# Patient Record
Sex: MALE | Race: WHITE
[De-identification: names, ages, dates, MRNs, and addresses within clinical notes are randomized per-mention and may not be internally consistent; named-entity substitution may affect disease eponyms.]

---

## 2020-11-02 ENCOUNTER — HOSPITAL ENCOUNTER (OUTPATIENT)
Dept: HOSPITAL 47 - RADECHMAIN | Age: 65
Discharge: HOME | End: 2020-11-02
Attending: FAMILY MEDICINE
Payer: MEDICARE

## 2020-11-02 DIAGNOSIS — R00.1: Primary | ICD-10-CM

## 2020-11-02 PROCEDURE — 93306 TTE W/DOPPLER COMPLETE: CPT

## 2020-11-03 NOTE — ECHOF
Referral Reason:R00.1 Bradycardia



MEASUREMENTS

--------

HEIGHT: 180.3 cm

WEIGHT: 84.4 kg

BP: 

IVSd:   1.3 cm     (0.6 - 1.1)

LVIDd:   2.9 cm     (3.9 - 5.3)

LVPWd:   1.4 cm     (0.6 - 1.1)

IVSs:   1.8 cm

LVIDs:   1.7 cm

LVPWs:   2.0 cm

LAESV Index (A-L):   25.31 ml/m

Ao Diam:   3.5 cm     (2.0 - 3.7)

AV Cusp:   2.7 cm     (1.5 - 2.6)

LA Diam:   3.0 cm     (2.7 - 3.8)

MV EXCURSION:   16.659 mm     (> 18.000)

MV EF SLOPE:   106 mm/s     (70 - 150)

EPSS:   1.0 cm

MV E Jared:   0.80 m/s

MV DecT:   250 ms

MV A Jared:   1.04 m/s

MV E/A Ratio:   0.77 

RAP:   5.00 mmHg

RVSP:   25.48 mmHg







FINDINGS

--------

This was a technically good study.

The left ventricular size is normal.   There is mild concentric left ventricular hypertrophy.   Overa
ll left ventricular systolic function is normal with, an EF between 55 - 60 %.   Normal LAP Grade 1 D
iastolic Dysfunction.

The right ventricle is normal in size.

The left atrial size is normal.    Normal LA  size by volume 22+/-6 ml/m2.

The right atrial size is normal.

Interatrial and interventricular septum intact.

Aortic valve is trileaflet and is mildly thickened.

The mitral valve is normal.   The mitral valve leaflets are mildly thickened.   Mild mitral annular c
alcification present.   There is trace mitral regurgitation.

The tricuspid valve appears structurally normal.   Mild tricuspid regurgitation present.   Right vent
ricular systolic pressure is normal at < 35 mmHg.

There is no pulmonic regurgitation present.

The aortic root size is normal.

Normal inferior vena cava with normal inspiratory collapse consistent with estimated right atrial pre
ssure of  5 mmHg.

There is no pericardial effusion.



CONCLUSIONS

--------

1. The left ventricular size is normal.

2. There is mild concentric left ventricular hypertrophy.

3. Overall left ventricular systolic function is normal with, an EF between 55 - 60 %.

4. Normal LAP Grade 1 Diastolic Dysfunction.

5. Aortic valve is trileaflet and is mildly thickened.

6. The mitral valve leaflets are mildly thickened.

7. Mild mitral annular calcification present.

8. There is trace mitral regurgitation.

9. Mild tricuspid regurgitation present.

10. There is no pericardial effusion.





SONOGRAPHER: Terra Ty RDCS

## 2021-03-28 ENCOUNTER — HOSPITAL ENCOUNTER (INPATIENT)
Dept: HOSPITAL 47 - EC | Age: 66
LOS: 3 days | Discharge: HOME | DRG: 65 | End: 2021-03-31
Attending: FAMILY MEDICINE | Admitting: FAMILY MEDICINE
Payer: MEDICARE

## 2021-03-28 DIAGNOSIS — R20.0: ICD-10-CM

## 2021-03-28 DIAGNOSIS — R00.1: ICD-10-CM

## 2021-03-28 DIAGNOSIS — E78.00: ICD-10-CM

## 2021-03-28 DIAGNOSIS — I63.22: Primary | ICD-10-CM

## 2021-03-28 DIAGNOSIS — F10.130: ICD-10-CM

## 2021-03-28 DIAGNOSIS — Z79.82: ICD-10-CM

## 2021-03-28 DIAGNOSIS — Z82.3: ICD-10-CM

## 2021-03-28 DIAGNOSIS — I10: ICD-10-CM

## 2021-03-28 PROCEDURE — 82553 CREATINE MB FRACTION: CPT

## 2021-03-28 PROCEDURE — 70450 CT HEAD/BRAIN W/O DYE: CPT

## 2021-03-28 PROCEDURE — 99285 EMERGENCY DEPT VISIT HI MDM: CPT

## 2021-03-28 PROCEDURE — 85610 PROTHROMBIN TIME: CPT

## 2021-03-28 PROCEDURE — 93312 ECHO TRANSESOPHAGEAL: CPT

## 2021-03-28 PROCEDURE — 85730 THROMBOPLASTIN TIME PARTIAL: CPT

## 2021-03-28 PROCEDURE — 83036 HEMOGLOBIN GLYCOSYLATED A1C: CPT

## 2021-03-28 PROCEDURE — 93306 TTE W/DOPPLER COMPLETE: CPT

## 2021-03-28 PROCEDURE — 93005 ELECTROCARDIOGRAM TRACING: CPT

## 2021-03-28 PROCEDURE — 36415 COLL VENOUS BLD VENIPUNCTURE: CPT

## 2021-03-28 PROCEDURE — 80061 LIPID PANEL: CPT

## 2021-03-28 PROCEDURE — 84484 ASSAY OF TROPONIN QUANT: CPT

## 2021-03-28 PROCEDURE — 70498 CT ANGIOGRAPHY NECK: CPT

## 2021-03-28 PROCEDURE — 93320 DOPPLER ECHO COMPLETE: CPT

## 2021-03-28 PROCEDURE — 80053 COMPREHEN METABOLIC PANEL: CPT

## 2021-03-28 PROCEDURE — 93325 DOPPLER ECHO COLOR FLOW MAPG: CPT

## 2021-03-28 PROCEDURE — 70496 CT ANGIOGRAPHY HEAD: CPT

## 2021-03-28 PROCEDURE — 85025 COMPLETE CBC W/AUTO DIFF WBC: CPT

## 2021-03-28 PROCEDURE — 70551 MRI BRAIN STEM W/O DYE: CPT

## 2021-03-28 PROCEDURE — 84207 ASSAY OF VITAMIN B-6: CPT

## 2021-03-28 PROCEDURE — 96360 HYDRATION IV INFUSION INIT: CPT

## 2021-03-28 PROCEDURE — 82550 ASSAY OF CK (CPK): CPT

## 2021-03-28 PROCEDURE — 93880 EXTRACRANIAL BILAT STUDY: CPT

## 2021-03-28 PROCEDURE — 82607 VITAMIN B-12: CPT

## 2021-03-28 PROCEDURE — 87635 SARS-COV-2 COVID-19 AMP PRB: CPT

## 2021-03-28 PROCEDURE — 84443 ASSAY THYROID STIM HORMONE: CPT

## 2021-03-28 PROCEDURE — 82747 ASSAY OF FOLIC ACID RBC: CPT

## 2021-03-29 LAB
ALBUMIN SERPL-MCNC: 4.1 G/DL (ref 3.5–5)
ALP SERPL-CCNC: 100 U/L (ref 38–126)
ALT SERPL-CCNC: 55 U/L (ref 4–49)
ANION GAP SERPL CALC-SCNC: 8 MMOL/L
APTT BLD: 22.7 SEC (ref 22–30)
AST SERPL-CCNC: 38 U/L (ref 17–59)
BASOPHILS # BLD AUTO: 0.1 K/UL (ref 0–0.2)
BASOPHILS NFR BLD AUTO: 1 %
BUN SERPL-SCNC: 20 MG/DL (ref 9–20)
CALCIUM SPEC-MCNC: 9.4 MG/DL (ref 8.4–10.2)
CHLORIDE SERPL-SCNC: 106 MMOL/L (ref 98–107)
CK SERPL-CCNC: 106 U/L (ref 55–170)
CO2 SERPL-SCNC: 24 MMOL/L (ref 22–30)
EOSINOPHIL # BLD AUTO: 0.4 K/UL (ref 0–0.7)
EOSINOPHIL NFR BLD AUTO: 6 %
ERYTHROCYTE [DISTWIDTH] IN BLOOD BY AUTOMATED COUNT: 5.12 M/UL (ref 4.3–5.9)
ERYTHROCYTE [DISTWIDTH] IN BLOOD: 13 % (ref 11.5–15.5)
GLUCOSE BLD-MCNC: 80 MG/DL (ref 75–99)
GLUCOSE SERPL-MCNC: 82 MG/DL (ref 74–99)
HCT VFR BLD AUTO: 47.7 % (ref 39–53)
HGB BLD-MCNC: 16.1 GM/DL (ref 13–17.5)
INR PPP: 1 (ref ?–1.2)
LYMPHOCYTES # SPEC AUTO: 2.2 K/UL (ref 1–4.8)
LYMPHOCYTES NFR SPEC AUTO: 29 %
MCH RBC QN AUTO: 31.4 PG (ref 25–35)
MCHC RBC AUTO-ENTMCNC: 33.7 G/DL (ref 31–37)
MCV RBC AUTO: 93 FL (ref 80–100)
MONOCYTES # BLD AUTO: 0.6 K/UL (ref 0–1)
MONOCYTES NFR BLD AUTO: 8 %
NEUTROPHILS # BLD AUTO: 4.2 K/UL (ref 1.3–7.7)
NEUTROPHILS NFR BLD AUTO: 55 %
PLATELET # BLD AUTO: 189 K/UL (ref 150–450)
POTASSIUM SERPL-SCNC: 4 MMOL/L (ref 3.5–5.1)
PROT SERPL-MCNC: 6.9 G/DL (ref 6.3–8.2)
PT BLD: 10.5 SEC (ref 9–12)
SODIUM SERPL-SCNC: 138 MMOL/L (ref 137–145)
TROPONIN I SERPL-MCNC: <0.012 NG/ML (ref 0–0.03)
WBC # BLD AUTO: 7.5 K/UL (ref 3.8–10.6)

## 2021-03-29 RX ADMIN — CLOPIDOGREL BISULFATE SCH MG: 75 TABLET ORAL at 09:09

## 2021-03-29 RX ADMIN — CEFAZOLIN SCH MLS/HR: 330 INJECTION, POWDER, FOR SOLUTION INTRAMUSCULAR; INTRAVENOUS at 02:05

## 2021-03-29 RX ADMIN — Medication SCH MG: at 17:40

## 2021-03-29 NOTE — CT
EXAMINATION TYPE: CT brain wo con for TPA

 

DATE OF EXAM: 3/29/2021

 

COMPARISON: None

 

HISTORY: R/O Stroke, Rt sided numbness

 

CT DLP: 1060 mGycm

Automated exposure control for dose reduction was used.

 

Images obtained of the brain without contrast.

 

Ventricles and sulci appear within normal limits. There is 1 cm area of white matter hypodensity in t
he lateral anterior aspect left internal capsule.. The calvarium is intact. Skull base is intact. The
re is no evidence of intracranial hemorrhage.

 

IMPRESSION:

Lacunar infarct anterior left internal capsule appears old. No acute intracranial abnormality.

## 2021-03-29 NOTE — ECHOF
Referral Reason:Thrombus



MEASUREMENTS

--------

HEIGHT: 182.9 cm

WEIGHT: 91.2 kg

BP: 175/87

RVIDd:   2.6 cm     (< 3.3)

IVSd:   1.2 cm     (0.6 - 1.1)

LVIDd:   3.9 cm     (3.9 - 5.3)

LVPWd:   1.1 cm     (0.6 - 1.1)

IVSs:   1.5 cm

LVIDs:   2.9 cm

LVPWs:   1.4 cm

LAESV Index (A-L):   18.86 ml/m

Ao Diam:   3.6 cm     (2.0 - 3.7)

AV Cusp:   1.7 cm     (1.5 - 2.6)

MV EXCURSION:   15.618 mm     (> 18.000)

MV EF SLOPE:   93 mm/s     (70 - 150)

EPSS:   0.9 cm

MV E Jared:   0.56 m/s

MV DecT:   303 ms

MV A Jared:   0.94 m/s

MV E/A Ratio:   0.60 

RAP:   5.00 mmHg

RVSP:   23.73 mmHg







FINDINGS

--------

Sinus rhythm.

This was a technically good study.

The left ventricular size is normal.   There is borderline concentric left ventricular hypertrophy.  
 Overall left ventricular systolic function is normal with, an EF between 55 - 60 %.

The right ventricle is normal in size.

Normal LA  size by volume 22+/-6 ml/m2.

The right atrial size is normal.

There is mild aortic valve sclerosis.   There is no evidence of aortic regurgitation.

Mild mitral annular calcification present.   Mild mitral regurgitation is present.

Mild tricuspid regurgitation present.   There is no evidence of pulmonary hypertension.

Trace/mild (physiologic)  pulmonic regurgitation.

The aortic root size is normal.

Echo free space represents a pericardial fat pad.



CONCLUSIONS

--------

1. The left ventricular size is normal.

2. There is borderline concentric left ventricular hypertrophy.

3. Overall left ventricular systolic function is normal with, an EF between 55 - 60 %.

4. The right ventricle is normal in size.

5. Normal LA size by volume 22+/-6 ml/m2.

6. The right atrial size is normal.

7. There is mild aortic valve sclerosis.

8. Mild mitral annular calcification present.

9. Mild mitral regurgitation is present.

10. Mild tricuspid regurgitation present.

11. Trace/mild (physiologic)  pulmonic regurgitation.

12. The aortic root size is normal.

13. Echo free space represents a pericardial fat pad.





SONOGRAPHER: Rajwinder Dow RDCS

## 2021-03-29 NOTE — CT
EXAMINATION TYPE: CT angio head neck

 

DATE OF EXAM: 3/29/2021

 

COMPARISON: None

 

HISTORY: R/O Stroke, Rt sided numbness

 

CT DLP: 588.5 mGycm

Automated exposure control for dose reduction was used.

 

CONTRAST: 

Performed with IV Contrast, patient injected with 65 mL of Isovue 370.

 

Images were obtained from the aortic arch to the vertex of the brain with IV contrast. There are 3-D 
post processed images.

 

There is normal branching pattern of the great vessels on the aortic arch. There is bilateral arteria
l flow in the subclavian arteries.

 

There is arterial flow in both vertebral arteries. There is arterial flow in the common internal and 
external carotid arteries bilaterally. There is wide patency of the carotid artery bifurcations. Ther
e is no evidence of carotid or vertebral artery aneurysm or dissection.

 

There is arterial flow in the anterior middle and posterior cerebral arteries. There is arterial flow
 in the vertebrobasilar artery system. There is normal contrast opacification of the venous sinuses.

 

There is no mass effect. There is no sign of intracranial aneurysm or neovascularity. There is no radha
dence of hemodynamic stenosis. The right anterior cerebral artery appears to fill entirely through th
e anterior communicating artery.

 

IMPRESSION:

Negative CT angiogram of the neck. Negative CT angiogram of the brain.

## 2021-03-29 NOTE — ED
Neuro HPI





- General


Chief Complaint: Neuro Symptoms/Deficit


Stated Complaint: Right arm tingling, disoriented


Time Seen by Provider: 03/28/21 23:57


Source: patient, family


Mode of arrival: wheelchair


Limitations: no limitations





- Related Data


Allergies/Adverse Reactions: 


                                    Allergies











Allergy/AdvReac Type Severity Reaction Status Date / Time


 


No Known Allergies Allergy   Verified 03/28/21 23:54














Review of Systems


ROS Statement: 


Those systems with pertinent positive or pertinent negative responses have been 

documented in the HPI.





ROS Other: All systems not noted in ROS Statement are negative.





General Exam


Limitations: no limitations





Stroke MDM





- Lab Data


Result diagrams: 


                                 03/29/21 00:20





                                 03/29/21 00:20


                                   Lab Results











  03/29/21 03/29/21 03/29/21 Range/Units





  00:18 00:20 00:20 


 


WBC   7.5   (3.8-10.6)  k/uL


 


RBC   5.12   (4.30-5.90)  m/uL


 


Hgb   16.1   (13.0-17.5)  gm/dL


 


Hct   47.7   (39.0-53.0)  %


 


MCV   93.0   (80.0-100.0)  fL


 


MCH   31.4   (25.0-35.0)  pg


 


MCHC   33.7   (31.0-37.0)  g/dL


 


RDW   13.0   (11.5-15.5)  %


 


Plt Count   189   (150-450)  k/uL


 


MPV   7.0   


 


Neutrophils %   55   %


 


Lymphocytes %   29   %


 


Monocytes %   8   %


 


Eosinophils %   6   %


 


Basophils %   1   %


 


Neutrophils #   4.2   (1.3-7.7)  k/uL


 


Lymphocytes #   2.2   (1.0-4.8)  k/uL


 


Monocytes #   0.6   (0-1.0)  k/uL


 


Eosinophils #   0.4   (0-0.7)  k/uL


 


Basophils #   0.1   (0-0.2)  k/uL


 


PT    10.5  (9.0-12.0)  sec


 


INR    1.0  (<1.2)  


 


APTT    22.7  (22.0-30.0)  sec


 


Sodium     (137-145)  mmol/L


 


Potassium     (3.5-5.1)  mmol/L


 


Chloride     ()  mmol/L


 


Carbon Dioxide     (22-30)  mmol/L


 


Anion Gap     mmol/L


 


BUN     (9-20)  mg/dL


 


Creatinine     (0.66-1.25)  mg/dL


 


Est GFR (CKD-EPI)AfAm     (>60 ml/min/1.73 sqM)  


 


Est GFR (CKD-EPI)NonAf     (>60 ml/min/1.73 sqM)  


 


Glucose     (74-99)  mg/dL


 


POC Glucose (mg/dL)  80    (75-99)  mg/dL


 


POC Glu Operater ID  Jimmy Pennington    


 


Calcium     (8.4-10.2)  mg/dL


 


Total Bilirubin     (0.2-1.3)  mg/dL


 


AST     (17-59)  U/L


 


ALT     (4-49)  U/L


 


Alkaline Phosphatase     ()  U/L


 


Creatine Kinase     ()  U/L


 


CK-MB (CK-2)     (0.0-2.4)  ng/mL


 


Troponin I     (0.000-0.034)  ng/mL


 


Total Protein     (6.3-8.2)  g/dL


 


Albumin     (3.5-5.0)  g/dL














  03/29/21 03/29/21 Range/Units





  00:20 00:20 


 


WBC    (3.8-10.6)  k/uL


 


RBC    (4.30-5.90)  m/uL


 


Hgb    (13.0-17.5)  gm/dL


 


Hct    (39.0-53.0)  %


 


MCV    (80.0-100.0)  fL


 


MCH    (25.0-35.0)  pg


 


MCHC    (31.0-37.0)  g/dL


 


RDW    (11.5-15.5)  %


 


Plt Count    (150-450)  k/uL


 


MPV    


 


Neutrophils %    %


 


Lymphocytes %    %


 


Monocytes %    %


 


Eosinophils %    %


 


Basophils %    %


 


Neutrophils #    (1.3-7.7)  k/uL


 


Lymphocytes #    (1.0-4.8)  k/uL


 


Monocytes #    (0-1.0)  k/uL


 


Eosinophils #    (0-0.7)  k/uL


 


Basophils #    (0-0.2)  k/uL


 


PT    (9.0-12.0)  sec


 


INR    (<1.2)  


 


APTT    (22.0-30.0)  sec


 


Sodium  138   (137-145)  mmol/L


 


Potassium  4.0   (3.5-5.1)  mmol/L


 


Chloride  106   ()  mmol/L


 


Carbon Dioxide  24   (22-30)  mmol/L


 


Anion Gap  8   mmol/L


 


BUN  20   (9-20)  mg/dL


 


Creatinine  0.94   (0.66-1.25)  mg/dL


 


Est GFR (CKD-EPI)AfAm  >90   (>60 ml/min/1.73 sqM)  


 


Est GFR (CKD-EPI)NonAf  85   (>60 ml/min/1.73 sqM)  


 


Glucose  82   (74-99)  mg/dL


 


POC Glucose (mg/dL)    (75-99)  mg/dL


 


POC Glu Operater ID    


 


Calcium  9.4   (8.4-10.2)  mg/dL


 


Total Bilirubin  0.5   (0.2-1.3)  mg/dL


 


AST  38   (17-59)  U/L


 


ALT  55 H   (4-49)  U/L


 


Alkaline Phosphatase  100   ()  U/L


 


Creatine Kinase  106   ()  U/L


 


CK-MB (CK-2)   2.1  (0.0-2.4)  ng/mL


 


Troponin I   <0.012  (0.000-0.034)  ng/mL


 


Total Protein  6.9   (6.3-8.2)  g/dL


 


Albumin  4.1   (3.5-5.0)  g/dL














- EKG Data


-: EKG Interpreted by Me (EKG shows sinus  Bradycardia rate of 53  QRS 92 

)





Past Medical History


Past Medical History: No Reported History


History of Any Multi-Drug Resistant Organisms: None Reported


Past Surgical History: Appendectomy


Past Psychological History: No Psychological Hx Reported


Smoking Status: Never smoker


Past Alcohol Use History: Occasional


Past Drug Use History: None Reported





Course


                                   Vital Signs











  03/28/21





  23:49


 


Temperature 98.1 F


 


Pulse Rate 61


 


Respiratory 18





Rate 


 


Blood Pressure 195/101


 


O2 Sat by Pulse 99





Oximetry 














Disposition


Clinical Impression: 


 Cerebrovascular accident (CVA), Transient cerebral ischemia





Disposition: ADMITTED AS IP TO THIS HOSP


Condition: Fair


Is patient prescribed a controlled substance at d/c from ED?: No


Referrals: 


Silvestre Scott MD [Primary Care Provider] - 1-2 days

## 2021-03-29 NOTE — P.CNNES
History of Present Illness


Consult date: 21


Requesting physician: Jorge Blandon


Reason for Consult: right sided numbness 


History of Present Illness: 


This is a 65-year-old gentleman with recent diagnosis of HTN (controlled with 

diet) and hypercholestremia that presented to the emergency department on 

2021 for right-sided numbness.  He stated that his numbness started around

2:00 to 2:30 on the 2021 and that was on the entire right upper extremity 

as well as the side of the V1 and V2 V3 distribution.  He feels like the 

numbness has been improving but has not resolved.  He also felt like he is 

having right-sided weakness and had difficulty walking yesterday but improved 

today.  Patient denies off any neck pain or any current headaches.  He denies of

any history of stroke or TIAs in the past that.  He stated that he is on aspirin

81 mg daily.  In the past he stood be on the Lipitor 10 mg per was having joint 

pain so he stopped.  He is not on any the cholesterol medication and because of 

the side effects.  Regarding his hypertonicity was recently diagnosed with 

hypertension by his primary care and he said that that that was being monitored 

but was not on any medication.


He said that he drinks about 9-10 and severe off 12 ounces once a week and he's 

been doing it for years.





Workup in the hospital consisted of:


CT of the head is reported as lacunar infarct in the anterior left internal 

capsule appears old.  No acute intracranial abnormality.  Personally reviewed 

the CT of the head and I agree there is no intraparenchymal hemorrhage acute or 

subacute ischemia.  I felt there is lacunar infarct on bilateral internal 

capsule and at the borders of the thalamus bilaterally.


CT angiography of the head and neck was reported as negative for the brain as 

well as the neck.


2-D echo was reported as borderline concentric left ventricular hypertrophy.  

Ejection fraction between 55 and 60%.  Normal left atrial size.


EKG is reported as sinus bradycardia.  Otherwise normal EKG.


Initial POC glucose is 80.








Review of Systems


Review of system:  The 12 point system was reviewed and apparent positive and 

negative per HPI.








Past Medical History


Past Medical History: No Reported History


History of Any Multi-Drug Resistant Organisms: None Reported


Past Surgical History: Appendectomy


Past Anesthesia/Blood Transfusion Reactions: No Reported Reaction


Past Psychological History: No Psychological Hx Reported


Smoking Status: Never smoker


Past Alcohol Use History: Occasional


Past Drug Use History: None Reported





- Past Family History


  ** Father


Family Medical History: CVA/TIA


Additional Family Medical History / Comment(s): Pt states  of stroke





  ** Mother


Family Medical History: CVA/TIA


Additional Family Medical History / Comment(s): Pt states  of stroke





  ** Sister(s)


Family Medical History: CVA/TIA


Additional Family Medical History / Comment(s): Pt states  of stroke





Medications and Allergies


                                Home Medications











 Medication  Instructions  Recorded  Confirmed  Type


 


Aspirin EC [Ecotrin Low Dose] 81 mg PO DAILY 21 History


 


Cholecalciferol [Vitamin D3 (25 25 mcg PO DAILY 21 History





Mcg = 1000 Iu)]    








                                    Allergies











Allergy/AdvReac Type Severity Reaction Status Date / Time


 


No Known Allergies Allergy   Verified 21 07:03














Physical Examination





- Vital Signs


Vital Signs: 


                                   Vital Signs











  Temp Pulse Pulse Resp BP BP Pulse Ox


 


 21 12:10  98.2 F   59 L  18   160/92  97


 


 21 09:05  98.1 F   58 L  16   149/80  96


 


 21 04:00  97.9 F   50 L  18   175/87  98


 


 21 02:45   50 L   18  153/88   97


 


 21 02:00  98.3 F  50 L   18  152/87  


 


 21 01:30   54 L   18  158/89  


 


 21 01:00  98.2 F  50 L   18  162/92  


 


 21 00:45   56 L   18  147/89  


 


 21 00:30   55 L   18  151/87  


 


 21 00:15   55 L   18  166/88  


 


 21 00:01  98.1 F  62   18  167/101   98


 


 21 23:49  98.1 F  61   18  195/101   99








                                Intake and Output











 21





 22:59 06:59 14:59


 


Intake Total   240


 


Balance   240


 


Intake:   


 


  Oral   240


 


Other:   


 


  # Voids  0 


 


  Weight  91.3 kg 











GENERAL: The patient is lying in bed and is not in acute distress.


CHEST: The heart rate is regular rate rhythm.   No murmurs to auscultation.  No 

carotid bruit bilaterally----.


LUNG: Clear to auscultation bilaterally no wheezing noted throughout.  Not 

labored breathing.


ABDOMEN/GI: Bowel sounds present in all 4 quadrants. No tenderness to palpation 

throughout.





NEUROLOGICAL:


Higher mental function: The patient is awake, alert, oriented to self, place and

time.  Patient is following commands.  No aphasia and no neglect.


Cranial nerves: The pupils are round, equal and reactive to light and ac

commodation.  Visual fields are full to confrontation throughout.  Extraocular 

movement is intact no nystagmus is noted.  Facial sensation is normal to touch 

throughout.  The facial strength is normal throughout.  Hearing is mildly to 

moderate decreased to hand rubs.   Tongue is midline and moved side-to-side 

without any difficulty.  No dysarthria is noted.  Shoulder shrug is normal 

bilaterally.


Motor: Gait is normal.  The strength is 5 over 5 throughout.  Normal tone and 

bulk.  


Cerebellum: Normal finger to nose  bilaterally.


Sensation: Sensation is normal to touch throughout.


Reflexes (right/left): 2+


Plantars are downgoing bilaterally. 





NIH Stroke Scale Score:


Level of consciousness 0


LOC questions 0


LOC commands 0


Best gaze 0


Visual fields 0 


Facial palsy 0


Left motor arm 0


Right motor arm 0


Left motor leg 0


Right motor leg 0


Limb ataxia 0


Sensory 0


Best language 0


Dysarthria 0


Extinction and inattention


Total NIHSS score: 0








Results


Corona virus PCR was not detected








- Laboratory Findings


CBC and BMP: 


                                 21 00:20





                                 21 00:20


Abnormal Lab Findings: 


                                  Abnormal Labs











  21





  00:20


 


ALT  55 H














Assessment and Plan


Assessment: 


This is a 55-year-old gentleman that came to the emergency department on 

2021 for right-sided numbness and weakness that started on 21.








Subjective Right sided numbness (face and arm) with weakness (weakness 

resolved): Is likely due to transient ischemic attack versus acute ischemic 

stroke


Lacunar stroke seems bilateral on CT head:  Small vessel disease (due to risk 

factors:hypercholestremia and hypertension)


Recent diagnosis of Hypertension


History of hypercholesterolemia


Alcohol use





Plan: 





Currently the patient is on aspirin 325 and Plavix 75 mg daily.  I decreased the

aspirin to 81 mg a.  The patient to continue dual antiplatelets for 21 days then

stop aspirin and to continue indefinitely on Plavix 75 mg daily.  Since the 

patient had side effects to Lipitor and it was a low dose Lipitor 10 mg I will 

not start the patient on statin.  Consider simvastatin 10 mg daily as an 

outpatient since we don't have the medication.  


Carotid duplex is ordered by the primary team.


I ordered MRI of the brain


Lipid panel is pending


PT, OT and SLP are consulted.


Ordered every 4 hours neuro checks


Placed the patient on continous cardiac monitoring





I ordered vitamin B12, folate and vitamin B6 levels.


I started the patient on thiamine 100 mg daily.


The patient was counseled on alcohol cessation.





Thank you for the consultation.





Jim Lancaster M.D.


Neuro-hospitalist





Time with Patient: Greater than 30

## 2021-03-29 NOTE — US
EXAMINATION TYPE: US carotid duplex BILAT

 

DATE OF EXAM: 3/29/2021

 

COMPARISON: NONE

 

CLINICAL HISTORY: 65-year-old male TIA/CVA. Right sided weakness, no history of stroke.

 

TECHNIQUE: Carotid duplex ultrasound examination. Indirect Doppler criteria was utilized.

 

FINDINGS:

 

EXAM MEASUREMENTS: 

 

RIGHT:  Peak Systolic Velocity (PSV) cm/sec

----- Right CCA:  79.9  

----- Right ICA:  89.2     

----- Right ECA:  89.4   

ICA/CCA ratio:  1.1    

 

RIGHT:  End Diastole cm/sec

----- Right CCA:  12.7   

----- Right ICA:  11.6      

----- Right ECA:  12.4     

 

LEFT:  Peak Systolic Velocity (PSV) cm/sec

----- Left CCA:  73.5  

----- Left ICA:  108.0   

----- Left ECA:  142.6  

ICA/CCA ratio:  1.5  

 

LEFT:  End Diastole cm/sec

----- Left CCA:  16.0  

----- Left ICA:  28.9   

----- Left ECA:  13.4 

 

VERTEBRALS (direction of flow):

Right Vertebral: Antegrade

Left Vertebral: Antegrade

 

Rhythm:  Normal

 

Sonographer notes: Heterogeneous plaque left bulb/ICA with no significant stenosis seen. 

 

 

 

IMPRESSION:  

No hemodynamically significant internal carotid artery stenosis on either side.

 

   

 

 

Criteria for Assigning % of Stenosis / Diameter reduction

(Estimation based on the indirect measurements of the internal carotid artery velocities (ICA PSV).

1.  Normal (no stenosis)=ICA PSV < 125 cm/s: ratio < 2.0: ICA EDV<40 cm/s.

2. Less than 50% stenosis=ICA PSV < 125 cm/s: ratio < 2.0: ICA EDV<40 cm/s.

3.  50 to 69% stenosis=ICA PSV of 125 to 230 cm/s: ration 2.0 ? 4.0: ICA EDV  cm/s.

4.  Greater than 70% stenosis to near occlusion= ICA PSV > 230 cm/s: ratio > 4.0: ICA EDV > 100 cm/s.
 

5.  Near occlusion= ICA PSV velocities may be low or undetectable: variable ratio and ICA EDV.

6.  Total occlusion=unable to detect flow.

## 2021-03-30 LAB
CHOLEST SERPL-MCNC: 255 MG/DL (ref ?–200)
CHOLEST SERPL-MCNC: 256 MG/DL (ref ?–200)
HBA1C MFR BLD: 5.3 % (ref 4–6)
HDLC SERPL-MCNC: 70 MG/DL (ref 40–60)
HDLC SERPL-MCNC: 72 MG/DL (ref 40–60)
LDLC SERPL CALC-MCNC: 151 MG/DL (ref 0–99)
LDLC SERPL CALC-MCNC: 153 MG/DL (ref 0–99)
TRIGL SERPL-MCNC: 162 MG/DL (ref ?–150)
TRIGL SERPL-MCNC: 164 MG/DL (ref ?–150)
VIT B12 SERPL-MCNC: 434 PG/ML (ref 200–944)

## 2021-03-30 RX ADMIN — Medication SCH MG: at 09:05

## 2021-03-30 RX ADMIN — ASPIRIN 81 MG CHEWABLE TABLET SCH MG: 81 TABLET CHEWABLE at 09:05

## 2021-03-30 RX ADMIN — CLOPIDOGREL BISULFATE SCH MG: 75 TABLET ORAL at 09:05

## 2021-03-30 NOTE — P.CRDCN
History of Present Illness


History of present illness: 





HISTORY OF PRESENTING ILLNESS


This is a pleasant 65-year-old male with no significant past medical history.  

He does not follow in the office with a cardiologist. We have been asked to see 

in consultation for embolic CVA.  He has been diagnosed with a TIA versus a 

stroke per neurology.  MRI is currently pending.  Echocardiogram obtained 

reveals preserved LV systolic function with ejection fraction 55-60%.  He 

initially presented to the hospital with numbness to the right side of his face 

that lasted for approximately 30 minutes.  The numbness is currently resolved.  

He denies symptoms of chest pain, shortness of breath, dizziness or 

palpitations.





DIAGNOSTICS


EKG reveals sinus bradycardia heart rate of 53 with no acute ST or T wave 

abnormalities noted.


Telemetry tracings indicate persistent sinus mechanism with no acute arrhythmias

noted.


Bilateral carotid Doppler reveals no hemodynamically significant stenosis 

bilaterally.


Laboratory reviewed, CBC unremarkable, sodium 138, potassium 4.0, creatinine 

0.94, troponin negative 1, TSH 3.37,  and HDL 72.


Current cardiac medications include aspirin 81 mg daily. 





REVIEW OF SYSTEMS


At the time of my exam:


CONSTITUTIONAL: Denies fever or chills.


CARDIOVASCULAR: Denies chest pain, shortness of breath, orthopnea, PND or pa

lpitations.


RESPIRATORY: Denies cough. 


GASTROINTESTINAL: Denies abdominal pain, diarrhea, constipation, nausea or 

vomiting.


MUSCULOSKELETAL: Denies myalgias.


NEUROLOGIC: Denies numbness, tingling, headacbe or weakness.


ENDOCRINE: Denies fatigue, weight change,  polydipsia or polyurina.


GENITOURINARY: Denies burning, hematuria or urgency with micturation.


HEMATOLOGIC: Denies history of anemia or bleeding. 





PHYSICAL EXAMINATION


Blood pressure 122/69 heart rate 61 afebrile and maintaining oxygen saturation 

on room air.


CONSTITUTIONAL: No apparent distress. 


HEENT: Head is normocephalic. Pupils are equal, round. Sclerae anicteric. Mucous

membranes of the mouth are moist.  No JVD. No carotid bruit.


CHEST EXAMINATION: Lungs are clear to auscultation. No chest wall tenderness is 

noted on palpation or with deep breathing. 


HEART EXAMINATION: Regular rate and rhythm. S1, S2 heard. No murmurs, gallops or

rub.


ABDOMEN: Soft, nontender. Positive bowel sounds.


EXTREMITIES: 2+ peripheral pulses, no lower extremity edema and no calf 

tenderness.


NEUROLOGIC EXAMINATION: Patient is awake, alert and oriented x3. 





ASSESSMENT


TIA vs CVA


Dyslipidemia


Hypertension


Daily alcohol intake





PLAN


He has eaten this morning, so we will plan for TEO tomorrow morning with Dr. Winters. 


NPO after midnight tonight. 


Thank you kindly for this consultation.





Nurse Practitioner note has been reviewed, I agree with a documented findings 

and plan of care.  Patient was seen and examined.











Past Medical History


Past Medical History: No Reported History


History of Any Multi-Drug Resistant Organisms: None Reported


Past Surgical History: Appendectomy


Past Anesthesia/Blood Transfusion Reactions: No Reported Reaction


Past Psychological History: No Psychological Hx Reported


Smoking Status: Never smoker


Past Alcohol Use History: Occasional


Past Drug Use History: None Reported





- Past Family History


  ** Father


Family Medical History: CVA/TIA


Additional Family Medical History / Comment(s): Pt states  of stroke





  ** Mother


Family Medical History: CVA/TIA


Additional Family Medical History / Comment(s): Pt states  of stroke





  ** Sister(s)


Family Medical History: CVA/TIA


Additional Family Medical History / Comment(s): Pt states  of stroke





Medications and Allergies


                                Home Medications











 Medication  Instructions  Recorded  Confirmed  Type


 


Aspirin EC [Ecotrin Low Dose] 81 mg PO DAILY 21 History


 


Cholecalciferol [Vitamin D3 (25 25 mcg PO DAILY 21 History





Mcg = 1000 Iu)]    








                                    Allergies











Allergy/AdvReac Type Severity Reaction Status Date / Time


 


No Known Allergies Allergy   Verified 21 07:03














Physical Exam


Vitals: 


                                   Vital Signs











  Temp Pulse Resp BP Pulse Ox


 


 21 09:00  98.2 F  61  16  122/69  98


 


 21 04:00   58 L  18  144/77  97


 


 21 02:00   55 L  18  


 


 21 23:51   55 L  18  142/81  97


 


 21 20:00   63  18  


 


 21 19:52  97.8 F  63  18  150/79 


 


 21 14:00    18  


 


 21 12:10  98.2 F  59 L  18  160/92  97








                                Intake and Output











 21





 22:59 06:59 14:59


 


Intake Total 118  


 


Balance 118  


 


Intake:   


 


  Oral 118  


 


Other:   


 


  # Voids 1 1 


 


  Weight  89.9 kg 














Results





                                 21 00:20





                                 21 00:20


                                     Lipids











  21 Range/Units





  07:37 07:37 


 


Triglycerides  162 H  164 H  (<150)  mg/dL


 


Cholesterol  255 H  256 H  (<200)  mg/dL


 


HDL Cholesterol  70 H  72 H  (40-60)  mg/dL








                               Current Medications











Generic Name Dose Route Start Last Admin





  Trade Name Reynaldoq  PRN Reason Stop Dose Admin


 


Aspirin  81 mg  21 09:00  21 09:05





  Aspirin 81 Mg  PO   81 mg





  DAILY FEMI   Administration


 


Clopidogrel Bisulfate  75 mg  21 09:00  21 09:05





  Clopidogrel 75 Mg Tab  PO   75 mg





  DAILY FEMI   Administration


 


Sodium Chloride  1,000 mls @ 100 mls/hr  21 02:00  21 02:05





  Saline 0.9%  IV   100 mls/hr





  .Q10H FEMI   Administration


 


Thiamine HCl  100 mg  21 13:15  21 09:05





  Thiamine 100 Mg Tab  PO   100 mg





  DAILY FEMI   Administration








                                Intake and Output











 21





 22:59 06:59 14:59


 


Intake Total 118  


 


Balance 118  


 


Intake:   


 


  Oral 118  


 


Other:   


 


  # Voids 1 1 


 


  Weight  89.9 kg 








                                        





                                 21 00:20 





                                 21 00:20

## 2021-03-30 NOTE — MR
EXAMINATION TYPE: MR brain wo con

 

DATE OF EXAM: 3/30/2021

 

COMPARISON: CT brain 3/29/2021

 

HISTORY: Right sided weakness

 

CONTRAST:  Performed utilizing 0 mL intravenous Gadavist gadolinium contrast.  

 

TECHNIQUE: Multiplanar, multiecho imaging on a 3.0 Amara magnet is performed through the brain.  Stud
y is performed within 24 hours of arrival to the hospital.

 

The craniovertebral junction is normal.  The pituitary is normal.  

 

Diffusion-weighted imaging is performed.  There is hyperintensity within the posterior left basal martha
glion extending to the periventricular occipital horn region compatible with an acute lacunar infarct
.

 

An old lacunar infarct is in the anterior left basal ganglion adjacent to the anterior horn left late
ral ventricle. There is some scattered periventricular and subcortical and deep white matter hyperint
ensities on T2 and inversion recovery weighted sequences which are nonspecific and can be related to 
microvascular ischemic change. Largest of these left corona radiata measuring 0.7 cm.

 

Ventricles and sulci are appropriate for the patient age.

 

 

IMPRESSIONS:

1. Acute left posterior basal ganglion lacunar infarct.

2. Periventricular and deep white matter changes likely chronic microvascular ischemic change.

 

A Orange level critical message alert has been initiated for Silvestre Scott MD via the [employer]
0 | Critical Results System on 3/30/2021 11:22 AM.  This message alert has been sent to Silvestre Scott MD via the preferences provided by the clinician for the receipt of Radiology Critical Findings. Me
ssage ID [id number].

## 2021-03-30 NOTE — HP
HISTORY AND PHYSICAL



A 65-year-old white male with right arm tingling, right facial tingling, disorientation

starting 24 hours after drinking 9-10 beers. He has never had this before. He does

drink binge drink 7-10 beers at any different time like 1 day a week for many years.

He has never had a stroke.  Borderline hypertension as an outpatient.  No medicines.



Allergies are negative.



His right arm numbness is resolved.  He has been given Plavix and aspirin. His right

facial numbness is still there.



Labs reviewed.  MRI and MRA were reviewed. Neurology consult reviewed.



Cardiovascular S1, S2.

Lungs clear.

GI soft.

Musculoskeletal strength 5/5 for extremities.

Cranial nerves are intact on the facial.  There is no facial asymmetry.



ASSESSMENT:

Transient ischemic attack versus cerebrovascular accident, suspected transient ischemic

attack. We are going to  do an MRI.  CTAs are normal.  We will get  cardiology to rule

out doing a TEO for patent foraminal ovale.  Make sure there is none of those.

Continue with Plavix and aspirin and maybe blood pressure control on the patient.

Alcohol counseling for binge drinking is informed over 1-2 drinks a day.  Over that, he

will have higher blood pressure and he is going to have withdrawal from all of this for

future alcohol drinking.  Please see further orders.





MMODL / IJN: [id number] / Job#: [id number]

## 2021-03-30 NOTE — PN
PROGRESS NOTE



A 65-year-old white male who had a stroke, left basal ganglia posteriorly, started on

Plavix and aspirin.  He is going to have a TEO tomorrow to rule out PFO as cause of

stroke.  If that is negative, we will send home with physical therapy.  His right arm

numbness greatly improved.



CARDIOVASCULAR: S1, S2.

LUNGS: Clear.

GI: Soft.

HEMATOLOGY: Negative Homans.



ASSESSMENT:

1. Alcohol daily intake.

2. Hypertension acceleration most likely the cause of his cerebrovascular accident of

    the basal ganglia on the left.

Neurologic symptoms are resolved. After TEO, we will discharge him home when cleared

after TEO.





MMODL / IJN: [id number] / Job#: [id number]

## 2021-03-30 NOTE — P.PN
Subjective


Progress Note Date: 03/30/21


Patient was seen today at bedside and he stated that the he feels neurologically

the same and denies any new weakness, numbness, visual disturbance.


He continues to have some numbness over the right side but feels it is 

improving.





MRI the brain was done today and it's reported as acute left posterior basal 

ganglia lacunar infarct.  Periventricular and deep white matter changes likely 

chronic microvascular ischemic changes.





Objective





- Vital Signs


Vital signs: 


                                   Vital Signs











Temp  98.3 F   03/30/21 11:41


 


Pulse  56 L  03/30/21 11:41


 


Resp  16   03/30/21 11:41


 


BP  152/83   03/30/21 11:41


 


Pulse Ox  98   03/30/21 11:41








                                 Intake & Output











 03/29/21 03/30/21 03/30/21





 18:59 06:59 18:59


 


Intake Total 460 118 236


 


Balance 460 118 236


 


Weight  89.9 kg 


 


Intake:   


 


  Oral 460 118 236


 


Other:   


 


  # Voids 2 1 














- Exam


GENERAL: The patient is lying in bed and is not in acute distress.





NEUROLOGICAL:


Higher mental function: The patient is awake, alert, oriented to self, place and

time.  Patient is following commands.  No aphasia and no neglect.


Cranial nerves: The pupils are round, equal and reactive to light and 

accommodation.  Visual fields are full to confrontation throughout.  Extraocular

movement is intact no nystagmus is noted.  Facial sensation is normal to touch 

throughout.  The facial strength is normal throughout.  Hearing is mildly to 

moderate decreased to hand rubs.   Tongue is midline and moved side-to-side 

without any difficulty.  No dysarthria is noted.  Shoulder shrug is normal 

bilaterally.


Motor: Gait is normal.  The strength is 5 over 5 throughout.  Normal tone and 

bulk.  


Cerebellum: Normal finger to nose  bilaterally.


Sensation: Sensation is normal to touch throughout.


Reflexes (right/left): 2+


Plantars are downgoing bilaterally. 








- Labs


CBC & Chem 7: 


                                 03/29/21 00:20





                                 03/29/21 00:20


Labs: 


                  Abnormal Lab Results - Last 24 Hours (Table)











  03/30/21 03/30/21 Range/Units





  07:37 07:37 


 


Triglycerides  162 H  164 H  (<150)  mg/dL


 


Cholesterol  255 H  256 H  (<200)  mg/dL


 


LDL Cholesterol, Calc  153 H  151 H  (0-99)  mg/dL


 


HDL Cholesterol  70 H  72 H  (40-60)  mg/dL














Assessment and Plan


Assessment: 


This is a 55-year-old gentleman that came to the emergency department on 

03/28/2021 for right-sided numbness and weakness that started on 03/27/21.





Acute ischemic stroke with acute left posterior basal ganglia lacunar infarct.  

(Subjective Right sided numbness (face and arm) with weakness (weakness 

resolved): Seems small vessel disease from risk factor (HTN, hypercholestremia)


Lacunar stroke seems bilateral on CT head:  Small vessel disease (due to risk 

factors:hypercholestremia and hypertension)


Vitamin B12: 257 which is low normal (possibly from alcohol use)


Recent diagnosis of Hypertension


History of hypercholesterolemia


Alcohol use





Plan: 





Continue dual antiplatelets (ASA 81mg and Plavix 75mg daily) for 21 days then 

stop aspirin and to continue indefinitely on Plavix 75 mg daily.  Since the 

patient had side effects to Lipitor and it was a low dose Lipitor 10 mg I will 

not start the patient on statin.  Consider simvastatin 10 mg daily as an 

outpatient since we don't have the medication.  


Carotid duplex: Was reported as no hemodynamically significant internal carotid 

artery stenosis on either side.


MRI the brain was done today and it's reported as acute left posterior basal 

ganglia lacunar infarct.  Periventricular and deep white matter changes likely 

chronic microvascular ischemic changes.


2-D echo was reported as overall left ventricular systolic function is normal 

with ejection fraction of 55-60%.  Normal left atrial size by volume.


Primary team consulted cardiology for a TEO and seems that the patient is being 

planned to get a TEO tomorrow morning.





Lipid panel: Triglyceride is 162, cholesterol 255, LDLs 153 and the HDL is 70.  

The LDL goal and the stroke is less than 70.


PT, OT and SLP are consulted.


On 4 hours neuro checks


On continous cardiac monitoring





Vitamin B12: 257 which is low normal.  Therefore I will give the patient vitamin

B12 IM 1000 g once today then I'll start the patient on the vitamin B12 at 

thousand micrograms daily by mouth.


RBC  folate: 555 (normal).


Vitamin B6 level: pending.


Hemoglobin A1c is 5.3 which is normal.  


Continue on thiamine 100 mg daily.


The patient was counseled on alcohol cessation.








If the TEO is normal then no further work-up is needed from a neurological 

standpoint.


The patient needs to follow-up with a neurologist as an outpatient within 1-2 

weeks.





Will conintue to follow.





Jim Lancaster M.D.


Neuro-hospitalist





Time with Patient: Less than 30

## 2021-03-31 VITALS
TEMPERATURE: 97.8 F | HEART RATE: 57 BPM | RESPIRATION RATE: 16 BRPM | SYSTOLIC BLOOD PRESSURE: 157 MMHG | DIASTOLIC BLOOD PRESSURE: 90 MMHG

## 2021-03-31 RX ADMIN — BENZOCAINE ONE SPRAY: 200 SPRAY DENTAL; ORAL; PERIODONTAL at 08:59

## 2021-03-31 RX ADMIN — ASPIRIN 81 MG CHEWABLE TABLET SCH MG: 81 TABLET CHEWABLE at 10:46

## 2021-03-31 RX ADMIN — CEFAZOLIN SCH: 330 INJECTION, POWDER, FOR SOLUTION INTRAMUSCULAR; INTRAVENOUS at 12:11

## 2021-03-31 RX ADMIN — Medication SCH MG: at 10:46

## 2021-03-31 RX ADMIN — BENZOCAINE ONE SPRAY: 200 SPRAY DENTAL; ORAL; PERIODONTAL at 08:56

## 2021-03-31 RX ADMIN — CLOPIDOGREL BISULFATE SCH MG: 75 TABLET ORAL at 10:46

## 2021-03-31 RX ADMIN — BENZOCAINE ONE SPRAY: 200 SPRAY DENTAL; ORAL; PERIODONTAL at 08:46

## 2021-03-31 NOTE — CDI
Documentation Clarification Form



Date: 03/31/2021 02:34:37 PM

From: Laura Barr CCS, CCDS

Phone: [phone]

MRN: [id number]

Admit Date: 03/29/2021 01:51:00 AM

Patient Name: Nick Kim

Visit Number: [id number]

Discharge Date:  





ATTENTION: The Clinical Documentation Specialists (CDI) and [facility] Coding Staff 
appreciate your assistance in clarifying documentation. Please respond to the 
clarification below the line at the bottom and electronically sign. The CDI & 
[facility] Coding staff will review the response and follow-up if needed. Please note: 
Queries are made part of the Legal Health Record. If you have any questions, 
please contact the author of this message via ITS.



Dr. Jim Lancaster:



Patient was admitted 3/29 with right arm tingling, right facial tingling and 
disorientation that started 24 hours after drinking 9-10 beers.

On 3/30 the patient was diagnosed with an Acute Ischemic Stroke with Acute Left 
Posterior Basal Ganglia.Lacunar Infarct.



History/Risk Factors per the 3/29 ED Note Past Medical History: Hypertension, 
Hypercholesterlolemia. 

Clinical Indicators: Presented to the ED on 3/29 with the above symptoms. 
Admitted with CVA vs TIA.

3/29 CT Brain: There is 1 cm area of white matter hypodensity in the lateral 
anterior aspect left internal capsule. The calvarium is intact. Skull base is 
intact. There is no evidence of intracranial hemorrhage. Lacunar infarct 
anterior left internal capsule appears old. No acute intracranial abnormality. 

3/29 CT Head/Neck: negative. 

3/29 US Carotids: No hemodynamically significant ICA stenosis.

3/30 CT Brain: Acute left posterior basal ganglion lacunar infarct. 
Periventricular and deep white matter changes likely chronic microvascular 
ischemic change.



Treatment 3/29: IV fluid 1,000 mls @ 999 mls/hr q1H, IV fluid 1,000 mls @ 100 
mls/hr q10H, po Aspirin, po Plavix, Vit B1, Neuro Assessments. 



In your professional opinion, can you please clarify the underlying cause, 
condition or process, if any, represented by these findings? 



[  ]  Cerebral edema, etiology know, please specify: _____________

[  ]  Cerebral edema, etiology unknown 

[  ]  Unable to determine

[  ]  Other Condition, please specify: _Patient has acute ischemic stroke.  It 
is small vessel disease from his risk factor of hypertension and 
hypercholestremia.  Other regarding cerebral edema no.  Otherwise unable to 
determine._______________ 





(Last Revision: October 2017)

___________________________________________________________________________

MTDD

## 2021-03-31 NOTE — P.PN
Subjective


Progress Note Date: 03/31/21


She was seen at bedside and he said that he's doing much better today compared 

to his initial presentation.  He stated that he is almost back to baseline he 

has some right ear otherwise he denies of any other neurological deficits or 

complaints.





The patient had transesophageal echocardiogram today and is reported as no 

intracardiac thrombus.  No shunting across that temperature atrial septum.  Left

ventricular function is normal.








Objective





- Vital Signs


Vital signs: 


                                   Vital Signs











Temp  97.8 F   03/31/21 12:01


 


Pulse  57 L  03/31/21 12:01


 


Resp  16   03/31/21 12:01


 


BP  157/90   03/31/21 12:01


 


Pulse Ox  99   03/31/21 12:01








                                 Intake & Output











 03/30/21 03/31/21 03/31/21





 18:59 06:59 18:59


 


Intake Total 956 240 250


 


Output Total  200 


 


Balance 956 40 250


 


Weight  90 kg 


 


Intake:   


 


  IV   250


 


  Oral 956 240 


 


Output:   


 


  Urine  200 


 


Other:   


 


  # Voids 2  














- Exam


GENERAL: The patient is lying in bed and is not in acute distress.





NEUROLOGICAL:


Higher mental function: The patient is awake, alert, oriented to self, place and

time.  Patient is following commands.  No aphasia and no neglect.


Cranial nerves: The pupils are round, equal and reactive to light and acc

ommodation.  Visual fields are full to confrontation throughout.  Extraocular 

movement is intact no nystagmus is noted.  Facial sensation is normal to touch 

throughout.  The facial strength is normal throughout.  Hearing is mildly to 

moderate decreased to hand rubs.   Tongue is midline and moved side-to-side 

without any difficulty.  No dysarthria is noted.  Shoulder shrug is normal 

bilaterally.


Motor: Gait is normal.  The strength is 5 over 5 throughout.  Normal tone and 

bulk.  


Cerebellum: Normal finger to nose  bilaterally.


Sensation: Sensation is normal to touch throughout.


Reflexes (right/left): 2+


Plantars are downgoing bilaterally. 








- Labs


CBC & Chem 7: 


                                 03/29/21 00:20





                                 03/29/21 00:20





Assessment and Plan


Assessment: 


This is a 55-year-old gentleman that came to the emergency department on 

03/28/2021 for right-sided numbness and weakness that started on 03/27/21.





Acute ischemic stroke with acute left posterior basal ganglia lacunar infarct.  

(Subjective Right sided numbness (face and arm) with weakness (weakness 

resolved): Seems small vessel disease from risk factor (HTN, hypercholestremia)


Lacunar stroke seems bilateral on CT head:  Small vessel disease (due to risk 

factors:hypercholestremia and hypertension)


Vitamin B12: 257 which is low normal (possibly from alcohol use)


Recent diagnosis of Hypertension


History of hypercholesterolemia


Alcohol use





Plan: 





Continue dual antiplatelets (ASA 81mg and Plavix 75mg daily) for 21 days then 

stop aspirin and to continue indefinitely on Plavix 75 mg daily.  Since the 

patient had side effects to Lipitor and it was a low dose Lipitor 10 mg I will 

not start the patient on statin.  Consider simvastatin 10 mg daily as an 

outpatient since we don't have the medication.  


Carotid duplex: Was reported as no hemodynamically significant internal carotid 

artery stenosis on either side.


MRI the brain was done today and it's reported as acute left posterior basal 

ganglia lacunar infarct.  Periventricular and deep white matter changes likely 

chronic microvascular ischemic changes.


2-D echo was reported as overall left ventricular systolic function is normal 

with ejection fraction of 55-60%.  Normal left atrial size by volume.


The patient had transesophageal echocardiogram today and is reported as no 

intracardiac thrombus.  No shunting across that temperature atrial septum.  Left

ventricular function is normal.





Lipid panel: Triglyceride is 162, cholesterol 255, LDLs 153 and the HDL is 70.  

The LDL goal and the stroke is less than 70.


PT, OT and SLP are consulted.


On 4 hours neuro checks


On continous cardiac monitoring





Vitamin B12: 257 which is low normal.  Therefore I will give the patient vitamin

B12 IM 1000 g once today then I'll start the patient on the vitamin B12 at 

thousand micrograms daily by mouth.


RBC  folate: 555 (normal).


Vitamin B6 level: pending.


Hemoglobin A1c is 5.3 which is normal.  


Continue on thiamine 100 mg daily.


The patient was counseled on alcohol cessation.





No further work-up is needed from a neurological standpoint.


The patient needs to follow-up with a neurologist as an outpatient within 1-2 

weeks.





Jim Lancaster M.D.


Neuro-hospitalist





Time with Patient: Less than 30

## 2021-03-31 NOTE — ECHOT
TRANSESOPHAGEAL ECHOCARDIOGRAM



INDICATIONS:

Cerebrovascular accident, rule out cardiac source of thromboembolic phenomenon.



PROCEDURE NOTE:

After obtaining informed consent, transesophageal echocardiogram is performed in left

lateral position using an Omniplane probe.  Local and IV sedation were obtained using 3

mg of Versed and 50 mcg of fentanyl. The patient tolerated the procedure well without

any obvious immediate complications.  Patient received moderate conscious sedation.

Total sedation time was 8 minutes.



FINDINGS:

1. There is no intracardiac thrombus within the left atrial appendage, left atrium,

    right atrium, right ventricle or left ventricle.

2. Left ventricle has normal size and systolic function.

3. Left atrium, right atrium, right ventricle are within normal limits.

4. Mitral valve is anatomically normal.  There is mild central mitral regurgitation

    noted.

5. Aortic valve is free of stenosis, regurgitation.

6. Tricuspid valve shows mild tricuspid regurgitation.

7. Interatrial Septum: There is no evidence of left-to-right shunt by color-flow

    Doppler or right-to-left shunt by agitated saline contrast study.

8. Aorta shows mild atherosclerotic changes.



CONCLUSION:

No intracardiac thrombus.  No shunting across the interatrial septum.  Left ventricular

function is normal.





MMODL / IJN: [id number] / Job#: [id number]

## 2021-03-31 NOTE — DS
DISCHARGE SUMMARY



A 65-year-old white male who is doing much better from his initial presentation.  He

had a basal ganglia stroke posteriorly. TEO which showed no PFO, no shunting, so CVA is

most likely due to hypertension acceleration.



Temperature is 97.8, pulse 57, respiratory 16, blood pressure 157/90, O2 99.

Cardiovascular S1, S2.  Lungs clear.  Extremities moves x4.  Normal cranial nerves.

Sensation normal. Reflexes normal. Strength is 5/5 x4 extremities.



ASSESSMENT:

1. Lacunar stroke.

2. Basal ganglia stroke.

3. Hypertension.

4. Hypercholesterolemia.

5. Alcohol abuse.



PLAN:

Continue on aspirin and Plavix for 21 days then stop aspirin and continue with Plavix.

They did not recommend a statin due to Lipitor myalgias in the past, except maybe try

simvastatin 10 as a trial.  Carotid and MRI of the brain as mentioned.  Echo was

normal.  TEO normal. Possibly PT OT will be needed. He will take vitamin B12 1000 mcg

once a day and continue with thiamine 100 mg a day for alcohol withdrawal.  Blood

pressure medication low dose will be given.



PROGNOSIS:

Guarded.





MMODL / IJN: [id number] / Job#: [id number]

## 2021-04-19 ENCOUNTER — HOSPITAL ENCOUNTER (EMERGENCY)
Dept: HOSPITAL 47 - EC | Age: 66
Discharge: HOME | End: 2021-04-19
Payer: MEDICARE

## 2021-04-19 VITALS — RESPIRATION RATE: 18 BRPM | TEMPERATURE: 97.6 F

## 2021-04-19 VITALS — HEART RATE: 57 BPM

## 2021-04-19 VITALS — SYSTOLIC BLOOD PRESSURE: 132 MMHG | DIASTOLIC BLOOD PRESSURE: 74 MMHG

## 2021-04-19 DIAGNOSIS — Z79.82: ICD-10-CM

## 2021-04-19 DIAGNOSIS — Z86.73: ICD-10-CM

## 2021-04-19 DIAGNOSIS — I10: ICD-10-CM

## 2021-04-19 DIAGNOSIS — R51.9: Primary | ICD-10-CM

## 2021-04-19 PROCEDURE — 99284 EMERGENCY DEPT VISIT MOD MDM: CPT

## 2021-04-19 PROCEDURE — 93005 ELECTROCARDIOGRAM TRACING: CPT

## 2021-04-19 PROCEDURE — 70450 CT HEAD/BRAIN W/O DYE: CPT

## 2021-04-19 NOTE — ED
General Adult HPI





- General


Chief complaint: Headache


Stated complaint: Headache


Time Seen by Provider: 21 19:30


Source: patient, RN notes reviewed


Mode of arrival: wheelchair


Limitations: no limitations





- History of Present Illness


Initial comments: 





Patient is a pleasant 65-year-old male presenting to the emergency Department 

with left-sided headache.  Onset of symptoms was around 3 hours ago.  Headache 

was in the left side behind the ear.  Discomfort was moderate and lasted around 

an hour or so.  Patient did have similar symptoms on the opposite side with 

recent stroke.  Symptoms have now resolved.  Patient denies any weakness or 

stroke symptoms at this time.  No blurred vision.  No speech problems or 

confusion.  No weakness.  No loss of sensation.  Patient did talk to his doctor 

who recommended he have a computed tomography scan of the brain.





- Related Data


                                Home Medications











 Medication  Instructions  Recorded  Confirmed


 


Aspirin EC [Ecotrin Low Dose] 81 mg PO DAILY 21


 


Cholecalciferol [Vitamin D3 (25 25 mcg PO DAILY 21





Mcg = 1000 Iu)]   








                                  Previous Rx's











 Medication  Instructions  Recorded


 


Clopidogrel [Plavix] 75 mg PO DAILY 30 Days #30 tab 21


 


Cyanocobalamin [Vitamin B-12] 1,000 mcg PO DAILY 90 Days #90 tab 21


 


Thiamine [Vitamin B-1] 100 mg PO DAILY 90 Days #90 tab 21


 


amLODIPine [Norvasc] 5 mg PO DAILY 30 Days #30 tab 21











                                    Allergies











Allergy/AdvReac Type Severity Reaction Status Date / Time


 


No Known Allergies Allergy   Verified 21 19:23














Review of Systems


ROS Statement: 


Those systems with pertinent positive or pertinent negative responses have been 

documented in the HPI.





ROS Other: All systems not noted in ROS Statement are negative.


Constitutional: Denies: fever


Eyes: Denies: eye pain


ENT: Denies: ear pain


Respiratory: Denies: cough


Cardiovascular: Denies: chest pain


Endocrine: Denies: fatigue


Gastrointestinal: Denies: abdominal pain


Genitourinary: Denies: dysuria


Musculoskeletal: Denies: back pain


Skin: Denies: rash


Neurological: Reports: headache.  Denies: weakness, numbness, paresthesias, 

confusion, vertigo





Past Medical History


Past Medical History: CVA/TIA, Hypertension


Additional Past Medical History / Comment(s): Recent cva 3/30/21


History of Any Multi-Drug Resistant Organisms: None Reported


Past Surgical History: Appendectomy


Past Anesthesia/Blood Transfusion Reactions: No Reported Reaction


Past Psychological History: No Psychological Hx Reported


Smoking Status: Never smoker


Past Alcohol Use History: Occasional


Past Drug Use History: None Reported





- Past Family History


  ** Father


Family Medical History: CVA/TIA


Additional Family Medical History / Comment(s): Pt states  of stroke





  ** Mother


Family Medical History: CVA/TIA


Additional Family Medical History / Comment(s): Pt states  of stroke





  ** Sister(s)


Family Medical History: CVA/TIA


Additional Family Medical History / Comment(s): Pt states  of stroke





General Exam


Limitations: no limitations


General appearance: alert, in no apparent distress


Head exam: Present: atraumatic, other (No tenderness over the temporal artery or

 area of complaint, left occipital)


Eye exam: Present: normal appearance, PERRL, EOMI


ENT exam: Present: normal oropharynx


Neck exam: Present: normal inspection


Respiratory exam: Present: normal lung sounds bilaterally


Cardiovascular Exam: Present: regular rate, normal rhythm


GI/Abdominal exam: Present: soft.  Absent: tenderness


Extremities exam: Present: normal inspection


Neurological exam: Present: alert, oriented X3, CN II-XII intact.  Absent: motor

 sensory deficit


  ** Expanded


Neurological exam: Present: protecting the airway


Patient oriented to: Present: person, place, time


Speech: Present: fluid speech


Cranial nerves: EOM's Intact: Normal, Facial Sensation: Normal


Sensory exam: Upper Extremity Light Touch: Normal, Lower Extremity Light Touch: 

Normal


Motor strength exam: RUE: 5, LUE: 5, RLE: 5, LLE: 5


Eye Response: (4) open spontaneously


Motor Response: (6) obeys commands


Verbal Response: (5) oriented


Psychiatric exam: Present: normal affect, normal mood


Skin exam: Present: normal color





Course


                                   Vital Signs











  21





  19:19 21:23


 


Temperature 97.6 F 


 


Pulse Rate 63 57 L


 


Respiratory 18 18





Rate  


 


Blood Pressure 145/77 119/78


 


O2 Sat by Pulse 99 95





Oximetry  














EKG Findings





- EKG Comments:


EKG Findings:: Normal sinus rhythm with rate of 61.  .  .  . 

 .  Normal axis.  Normal QRS.  No acute ST change.





Medical Decision Making





- Medical Decision Making





Patient reevaluated and remains symptom free.  Dr. Scott was updated in 

agreement with discharge and follow-up.  Patient updated.





- Radiology Data


Radiology results: report reviewed (Computed tomography scan of the brain shows 

encephalomalacia left frontal lacunar infarcts from previous MRI.  No acute 

abnormality.)





Disposition


Clinical Impression: 


 Cephalgia





Disposition: HOME SELF-CARE


Condition: Stable


Instructions (If sedation given, give patient instructions):  Acute Headache 

(ED)


Additional Instructions: 


Please follow-up with your primary care physician, Dr. Scott in the next day 

or 2 for recheck.  Return for weakness, confusion, speech problems, visual 

problems, loss of sensation, worsening symptoms or increased pain or any other 

concerns.


Is patient prescribed a controlled substance at d/c from ED?: No


Referrals: 


Silvestre Scott MD [Primary Care Provider] - 1-2 days


Time of Disposition: 22:11

## 2021-04-19 NOTE — CT
EXAMINATION TYPE: CT brain wo con

 

DATE OF EXAM: 4/19/2021

 

HISTORY: Left posterior headache. Recent TIA..

 

CT DLP: 1052.4 mGycm.  Automated Exposure Control for Dose Reduction was Utilized.

 

TECHNIQUE: CT scan of the head is performed without contrast.

 

COMPARISON: CT brain 3/29/2021. MRI brain 3/30/2021

 

FINDINGS:   

 

There is no acute intracranial hemorrhage, midline shift, or mass effect identified. There is evolvin
g encephalomalacia of the left frontal lacunar infarct as seen on 3/30/2021 MRI brain. There is also 
redemonstrated old lacunar infarct of the more inferior anterior left frontal lobe. Patchy white sebastien
er hypodensities likely sequela of chronic microvascular ischemic change.

 

The ventricles, sulci, and cisterns are normal in size and configuration.

 

No abnormal extra-axial fluid collection. No depressed calvarial fracture. The globes are grossly sym
metric. Mucosal retention cyst versus polyp of the right maxillary sinus. Mastoid air cells are clear
.   

 

IMPRESSION:  

1. Evolving encephalomalacia of the left frontal lacunar infarct seen on 3/30/2021 MRI comparison.

2. No new acute intracranial hemorrhage, midline shift, or mass effect.

## 2022-12-07 ENCOUNTER — HOSPITAL ENCOUNTER (OUTPATIENT)
Dept: HOSPITAL 47 - LABT | Age: 67
Discharge: HOME | End: 2022-12-07
Attending: INTERNAL MEDICINE
Payer: MEDICARE

## 2022-12-07 DIAGNOSIS — E78.2: Primary | ICD-10-CM

## 2022-12-12 ENCOUNTER — HOSPITAL ENCOUNTER (OUTPATIENT)
Dept: HOSPITAL 47 - LABWHC1 | Age: 67
Discharge: HOME | End: 2022-12-12
Attending: INTERNAL MEDICINE
Payer: MEDICARE

## 2022-12-12 DIAGNOSIS — E78.2: Primary | ICD-10-CM

## 2022-12-12 LAB
ALT SERPL-CCNC: 41 U/L (ref 10–49)
AST SERPL-CCNC: 25 U/L (ref 14–35)
CHOLEST SERPL-MCNC: 155 MG/DL (ref 0–200)
HDLC SERPL-MCNC: 67 MG/DL (ref 40–60)
LDLC SERPL CALC-MCNC: 69.8 MG/DL (ref 0–131)
TRIGL SERPL-MCNC: 90.8 MG/DL (ref 0–149)
VLDLC SERPL CALC-MCNC: 18.16 MG/DL (ref 5–40)

## 2022-12-12 PROCEDURE — 36415 COLL VENOUS BLD VENIPUNCTURE: CPT

## 2022-12-12 PROCEDURE — 84460 ALANINE AMINO (ALT) (SGPT): CPT

## 2022-12-12 PROCEDURE — 84450 TRANSFERASE (AST) (SGOT): CPT

## 2022-12-12 PROCEDURE — 80061 LIPID PANEL: CPT

## 2024-06-21 ENCOUNTER — HOSPITAL ENCOUNTER (OUTPATIENT)
Dept: HOSPITAL 47 - LABWHC1 | Age: 69
Discharge: HOME | End: 2024-06-21
Attending: INTERNAL MEDICINE
Payer: MEDICARE

## 2024-06-21 DIAGNOSIS — E78.2: Primary | ICD-10-CM

## 2024-06-21 LAB
ALT SERPL-CCNC: 44 U/L (ref 10–49)
AST SERPL-CCNC: 36 U/L (ref 14–35)
CHOLEST SERPL-MCNC: 230 MG/DL (ref 0–200)
HDLC SERPL-MCNC: 62 MG/DL (ref 40–60)
LDLC SERPL CALC-MCNC: 145.8 MG/DL (ref 0–131)
TRIGL SERPL-MCNC: 111 MG/DL (ref 0–149)
VLDLC SERPL CALC-MCNC: 22.2 MG/DL (ref 5–40)

## 2024-06-21 PROCEDURE — 80061 LIPID PANEL: CPT

## 2024-06-21 PROCEDURE — 84450 TRANSFERASE (AST) (SGOT): CPT

## 2024-06-21 PROCEDURE — 36415 COLL VENOUS BLD VENIPUNCTURE: CPT

## 2024-06-21 PROCEDURE — 84460 ALANINE AMINO (ALT) (SGPT): CPT

## 2025-01-13 ENCOUNTER — HOSPITAL ENCOUNTER (OUTPATIENT)
Dept: HOSPITAL 47 - LABWHC1 | Age: 70
Discharge: HOME | End: 2025-01-13
Attending: INTERNAL MEDICINE
Payer: MEDICARE

## 2025-01-13 DIAGNOSIS — E78.2: Primary | ICD-10-CM

## 2025-01-13 LAB
ALT SERPL-CCNC: 60 U/L (ref 10–49)
AST SERPL-CCNC: 38 U/L (ref 14–35)
CHOLEST SERPL-MCNC: 166 MG/DL (ref 0–200)
HDLC SERPL-MCNC: 64.9 MG/DL (ref 40–60)
LDLC SERPL CALC-MCNC: 76.7 MG/DL (ref 0–131)
TRIGL SERPL-MCNC: 122 MG/DL (ref 0–149)
VLDLC SERPL CALC-MCNC: 24.4 MG/DL (ref 5–40)

## 2025-01-13 PROCEDURE — 36415 COLL VENOUS BLD VENIPUNCTURE: CPT

## 2025-01-13 PROCEDURE — 84460 ALANINE AMINO (ALT) (SGPT): CPT

## 2025-01-13 PROCEDURE — 80061 LIPID PANEL: CPT

## 2025-01-13 PROCEDURE — 84450 TRANSFERASE (AST) (SGOT): CPT
